# Patient Record
Sex: MALE | Race: WHITE
[De-identification: names, ages, dates, MRNs, and addresses within clinical notes are randomized per-mention and may not be internally consistent; named-entity substitution may affect disease eponyms.]

---

## 2020-05-19 ENCOUNTER — HOSPITAL ENCOUNTER (OUTPATIENT)
Dept: HOSPITAL 102 - ANHLAB | Age: 80
End: 2020-05-19
Payer: COMMERCIAL

## 2020-05-19 DIAGNOSIS — C44.319: Primary | ICD-10-CM

## 2020-05-19 PROCEDURE — 88305 TISSUE EXAM BY PATHOLOGIST: CPT

## 2020-06-29 ENCOUNTER — HOSPITAL ENCOUNTER (OUTPATIENT)
Dept: HOSPITAL 102 - ANHLAB | Age: 80
End: 2020-06-29
Payer: COMMERCIAL

## 2020-06-29 DIAGNOSIS — C44.319: Primary | ICD-10-CM

## 2020-06-29 PROCEDURE — 88331 PATH CONSLTJ SURG 1 BLK 1SPC: CPT

## 2020-06-29 PROCEDURE — 88305 TISSUE EXAM BY PATHOLOGIST: CPT

## 2024-03-06 ENCOUNTER — HOSPITAL ENCOUNTER (EMERGENCY)
Facility: HOSPITAL | Age: 84
Discharge: HOME | End: 2024-03-06
Attending: EMERGENCY MEDICINE
Payer: COMMERCIAL

## 2024-03-06 VITALS
OXYGEN SATURATION: 98 % | SYSTOLIC BLOOD PRESSURE: 153 MMHG | HEIGHT: 67 IN | HEART RATE: 78 BPM | RESPIRATION RATE: 18 BRPM | TEMPERATURE: 99 F | BODY MASS INDEX: 29.03 KG/M2 | WEIGHT: 185 LBS | DIASTOLIC BLOOD PRESSURE: 94 MMHG

## 2024-03-06 DIAGNOSIS — S09.90XA INJURY OF HEAD, INITIAL ENCOUNTER: ICD-10-CM

## 2024-03-06 DIAGNOSIS — S09.93XA FACIAL INJURY, INITIAL ENCOUNTER: ICD-10-CM

## 2024-03-06 DIAGNOSIS — S02.2XXA CLOSED FRACTURE OF NASAL BONE, INITIAL ENCOUNTER: Primary | ICD-10-CM

## 2024-03-06 PROCEDURE — 99284 EMERGENCY DEPT VISIT MOD MDM: CPT | Mod: 25

## 2024-03-06 RX ORDER — AMOXICILLIN AND CLAVULANATE POTASSIUM 875; 125 MG/1; MG/1
1 TABLET, FILM COATED ORAL 2 TIMES DAILY
Qty: 20 TABLET | Refills: 0 | Status: SHIPPED | OUTPATIENT
Start: 2024-03-06
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
## 2024-03-07 NOTE — ED NOTES
Pt called from lobby for discharge papers, discharge assessment, informed by  marium BRADLEY, pt was discharge by resident, pt is not in lobby    I did not have contact with this pt

## 2024-03-07 NOTE — ED NOTES
"Informed by  Orin BRADLEY pt has returned with the wrong discharge papers, pt reports he was given the wrong discharge papers, I placed pt back on board and notify [facility] guaman    That pt returned to ER, rowena Haile verbalized understanding,    I provide discharge papers to pt, pt does not want to sit for vital, states " I was already gone"     A&ox3, in no distress,calm, in no acute distress,  "

## 2024-03-07 NOTE — DISCHARGE INSTRUCTIONS
Head injury precautions for the next 24 hours Tylenol if needed for pain   Augmentin as directed until all gone  Please follow-up with the ENT as directed for definitive care   Return for any concerns